# Patient Record
Sex: MALE | Race: WHITE | URBAN - METROPOLITAN AREA
[De-identification: names, ages, dates, MRNs, and addresses within clinical notes are randomized per-mention and may not be internally consistent; named-entity substitution may affect disease eponyms.]

---

## 2019-04-16 ENCOUNTER — OFFICE VISIT (OUTPATIENT)
Dept: INTERNAL MEDICINE CLINIC | Age: 64
End: 2019-04-16

## 2019-04-16 VITALS
SYSTOLIC BLOOD PRESSURE: 132 MMHG | DIASTOLIC BLOOD PRESSURE: 82 MMHG | WEIGHT: 212 LBS | TEMPERATURE: 97.8 F | HEIGHT: 68 IN | OXYGEN SATURATION: 98 % | BODY MASS INDEX: 32.13 KG/M2 | HEART RATE: 78 BPM | RESPIRATION RATE: 20 BRPM

## 2019-04-16 DIAGNOSIS — H50.89: ICD-10-CM

## 2019-04-16 DIAGNOSIS — I10 ESSENTIAL HYPERTENSION: ICD-10-CM

## 2019-04-16 DIAGNOSIS — R73.03 PREDIABETES: ICD-10-CM

## 2019-04-16 DIAGNOSIS — Z00.00 ENCOUNTER FOR PREVENTIVE HEALTH EXAMINATION: ICD-10-CM

## 2019-04-16 DIAGNOSIS — H81.09: ICD-10-CM

## 2019-04-16 DIAGNOSIS — E78.2 MIXED HYPERLIPIDEMIA: ICD-10-CM

## 2019-04-16 DIAGNOSIS — N13.8 BPH WITH OBSTRUCTION/LOWER URINARY TRACT SYMPTOMS: Primary | ICD-10-CM

## 2019-04-16 DIAGNOSIS — N40.1 BPH WITH OBSTRUCTION/LOWER URINARY TRACT SYMPTOMS: Primary | ICD-10-CM

## 2019-04-16 DIAGNOSIS — K21.9 GASTROESOPHAGEAL REFLUX DISEASE WITHOUT ESOPHAGITIS: ICD-10-CM

## 2019-04-16 DIAGNOSIS — R06.09 DYSPNEA ON EXERTION: ICD-10-CM

## 2019-04-16 RX ORDER — TAMSULOSIN HYDROCHLORIDE 0.4 MG/1
0.4 CAPSULE ORAL DAILY
Qty: 90 CAP | Refills: 3 | OUTPATIENT
Start: 2019-04-16 | End: 2022-03-10

## 2019-04-16 RX ORDER — GUAIFENESIN 100 MG/5ML
81 LIQUID (ML) ORAL DAILY
Qty: 90 TAB | Refills: 3 | Status: SHIPPED | OUTPATIENT
Start: 2019-04-16

## 2019-04-16 RX ORDER — SPIRONOLACTONE 25 MG/1
25 TABLET ORAL DAILY
Qty: 90 TAB | Refills: 3 | Status: SHIPPED | OUTPATIENT
Start: 2019-04-16 | End: 2022-03-10

## 2019-04-16 RX ORDER — AMLODIPINE AND BENAZEPRIL HYDROCHLORIDE 5; 20 MG/1; MG/1
1 CAPSULE ORAL DAILY
Qty: 90 CAP | Refills: 3 | Status: SHIPPED | OUTPATIENT
Start: 2019-04-16

## 2019-04-16 RX ORDER — GLUCOSAMINE/CHONDR SU A SOD 167-133 MG
500 CAPSULE ORAL 2 TIMES DAILY WITH MEALS
Qty: 60 TAB | Refills: 11 | Status: SHIPPED | OUTPATIENT
Start: 2019-04-16

## 2019-04-16 NOTE — PATIENT INSTRUCTIONS
Exertional dyspnea>>stress echo to be arranged by patient    Hyperlipidemia>>need repeat fasting lipid panel, CHO restriction    Prediabetes>>Low carb diet and increase activity    LUTS>>Flomax, consider PVR assessment    HTN>>controlled    Vitamin D deficiency>>Vitamin D3 2000 units daily    Monocytosis>>repeat CBC with differential    Confirm immunizations

## 2019-04-17 PROBLEM — M17.0 PRIMARY OSTEOARTHRITIS OF BOTH KNEES: Status: ACTIVE | Noted: 2019-04-17

## 2019-04-17 PROBLEM — H50.89 DUANE SYNDROME: Status: ACTIVE | Noted: 2019-04-17

## 2019-04-17 PROBLEM — K21.9 GASTROESOPHAGEAL REFLUX DISEASE WITHOUT ESOPHAGITIS: Status: ACTIVE | Noted: 2019-04-17

## 2019-07-31 ENCOUNTER — HOSPITAL ENCOUNTER (OUTPATIENT)
Dept: HOSPITAL 53 - M RAD | Age: 64
End: 2019-07-31
Attending: OTOLARYNGOLOGY
Payer: COMMERCIAL

## 2019-07-31 DIAGNOSIS — Z53.9: ICD-10-CM

## 2019-07-31 DIAGNOSIS — H90.41: Primary | ICD-10-CM

## 2019-08-13 ENCOUNTER — HOSPITAL ENCOUNTER (OUTPATIENT)
Dept: HOSPITAL 53 - M RAD | Age: 64
End: 2019-08-13
Attending: OTOLARYNGOLOGY
Payer: COMMERCIAL

## 2019-08-13 DIAGNOSIS — H90.3: Primary | ICD-10-CM

## 2019-08-13 LAB
BUN SERPL-MCNC: 13 MG/DL (ref 7–18)
CREAT SERPL-MCNC: 1.16 MG/DL (ref 0.7–1.3)
GFR SERPL CREATININE-BSD FRML MDRD: > 60 ML/MIN/{1.73_M2} (ref 49–?)

## 2019-08-23 ENCOUNTER — HOSPITAL ENCOUNTER (OUTPATIENT)
Dept: HOSPITAL 53 - M RAD | Age: 64
End: 2019-08-23
Attending: OTOLARYNGOLOGY
Payer: COMMERCIAL

## 2019-08-23 DIAGNOSIS — H90.41: ICD-10-CM

## 2019-08-23 DIAGNOSIS — M89.8X8: Primary | ICD-10-CM

## 2019-08-23 PROCEDURE — 70553 MRI BRAIN STEM W/O & W/DYE: CPT

## 2019-09-11 ENCOUNTER — HOSPITAL ENCOUNTER (OUTPATIENT)
Dept: HOSPITAL 53 - M RAD | Age: 64
End: 2019-09-11
Attending: OTOLARYNGOLOGY
Payer: COMMERCIAL

## 2019-09-11 DIAGNOSIS — D16.4: Primary | ICD-10-CM

## 2019-09-11 PROCEDURE — 70481 CT ORBIT/EAR/FOSSA W/DYE: CPT

## 2022-12-22 NOTE — PROGRESS NOTES
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 He presented copy of his nonfasting labs for review. Network connection was not available during this visit and I could not access Epic. Orders could not be entered during this visit. He reports episode of profound vertigo after finishing up a tonsillectomy case followed by nausea and vomiting. He was given IVF and IV Zofran with resolution. He declined ER investigation. He started himself on Dyazide although he was already on Aldactone and subsequently developed pre-renal azotemia and hypokalemia. He discontinued Dyazide. He started taking Niacin for hypertriglyceridemia and low HDL but his diet remains unrestricted. His dining options in [address] are limited and he has not been exercising. He is asking if statin therapy would help him and I told him that this would lower LDL but will not have a significant impact on his HDL or TG levels. He also mentioned recent problems with dyspnea on exertion that resolves with rest. This has not been accompanied by chest pain, PND, orthopnea, leg edema, cough, or wheezing. He also reports discontinuation of Flomax because of ejaculatory disturbance and has observed reduction in urinary flow and velocity. He is not enthusiastic about the idea of starting MARILYN because of effect on libido and erectile function. 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  Indications: high blood pressure    nicotinic acid (NIACIN) 500 mg tablet Take 1 Tab by mouth two (2) times daily (with meals). Indications: high amount of triglyceride in the blood    spironolactone (ALDACTONE) 25 mg tablet Take 1 Tab by mouth daily. Indications: high blood pressure    aspirin 81 mg chewable tablet Take 1 Tab by mouth daily. Indications: treatment to prevent a heart attack    tamsulosin (FLOMAX) 0.4 mg capsule Take 1 Cap by mouth daily. No current facility-administered medications for this visit. Allergies and Intolerances:   No Known Allergies     Family History:   Family History   Problem Relation Age of Onset    Lung Cancer Mother     Cancer Father     Prostate Cancer Brother      Social History:   He  reports that he has never smoked. He has never used smokeless tobacco.   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 HENT: Negative for hearing loss and sinus pain. Eyes: Negative for blurred vision. Respiratory: Positive for shortness of breath. Negative for cough and wheezing. Cardiovascular: Negative for chest pain, leg swelling and PND. Gastrointestinal: Negative for abdominal pain, blood in stool, heartburn and melena. Genitourinary: Positive for frequency. Musculoskeletal: Negative for joint pain. Skin: Negative for rash. Neurological: Negative for headaches. Endo/Heme/Allergies: Negative for environmental allergies. Psychiatric/Behavioral: Negative for depression. The patient does not have insomnia.         Physical:   Visit Vitals  /82   Pulse 78 Temp 97.8 °F (36.6 °C)   Resp 20   Ht 5' 8\" (1.727 m)   Wt 212 lb (96.2 kg)   SpO2 98%   BMI 32.23 kg/m²          Physical Exam   Constitutional: He is well-developed, well-nourished, and in no distress. HENT:   Mouth/Throat: Oropharynx is clear and moist.   Cerumen AU, Mallampati III   Eyes: Conjunctivae are normal. No scleral icterus. Neck: Neck supple. No JVD present. Cardiovascular: Normal rate, regular rhythm, normal heart sounds and intact distal pulses. Exam reveals no gallop. No murmur heard. Pulmonary/Chest: Breath sounds normal. He has no wheezes. He has no rales. Abdominal: Soft. Bowel sounds are normal. He exhibits no mass. There is no tenderness. Genitourinary:   Genitourinary Comments: Small reducible left inguinal hernia, smooth non-enlarged prostate   Musculoskeletal: He exhibits no edema. Lymphadenopathy:     He has no cervical adenopathy. Review of Data:      4/10/10   Glucose  102  Creat  1.11  TG  609  Cholesterol  250  HDL  36  Hgb 15.2  Monocytosis \                                                   PSA  0.41  Vitamin D  16.1  Labs:         Impression/Plan:   Patient Active Problem List   Diagnosis  Code    Exertional dyspnea with concern for angina equivalent EKG and stress echo order provided to patient to be done in [place] where he is headed after this visit R06.09    IGT from insulin resistance CHO restriction, consider Metformin R73.09    Hypertension controlled Continue current regimen   I10    Hyperlipidemia with persistently low HDL and high TG Repeat lipid panel on fasting basis and determine what medication adjustment needs to be made, emphasis on diet and exercise E78.5    Meniere's disease in remission   Continue Aldactone H81.09    BPH with obstruction/lower urinary tract symptoms Resume Flomax and monitor for improvement and consider PVR assessment N40.1, N13.8    Vitamin D deficiency Vitamin D3 2000 units a day and increase consumption of D rich food items E55    Monocytosis with etiology to be delineated (resolving viral infection, autoimmune disease, etc) Repeat CBC with differential D72.821    Screening/Wellness Confirm immunizations and colonoscopy R73.03             Yadira Weathers MD Total Volume (Ccs): 0.3